# Patient Record
Sex: FEMALE | Race: WHITE | NOT HISPANIC OR LATINO | Employment: OTHER | URBAN - METROPOLITAN AREA
[De-identification: names, ages, dates, MRNs, and addresses within clinical notes are randomized per-mention and may not be internally consistent; named-entity substitution may affect disease eponyms.]

---

## 2023-02-06 ENCOUNTER — LAB REQUISITION (OUTPATIENT)
Dept: LAB | Facility: CLINIC | Age: 78
End: 2023-02-06
Payer: MEDICARE

## 2023-02-06 DIAGNOSIS — G62.9 POLYNEUROPATHY, UNSPECIFIED: ICD-10-CM

## 2023-02-06 DIAGNOSIS — E03.9 HYPOTHYROIDISM, UNSPECIFIED: ICD-10-CM

## 2023-02-06 LAB
ALBUMIN SERPL BCG-MCNC: 4.2 G/DL (ref 3.5–5.2)
ALP SERPL-CCNC: 87 U/L (ref 35–104)
ALT SERPL W P-5'-P-CCNC: 9 U/L (ref 10–35)
ANION GAP SERPL CALCULATED.3IONS-SCNC: 11 MMOL/L (ref 7–15)
AST SERPL W P-5'-P-CCNC: 15 U/L (ref 10–35)
BILIRUB SERPL-MCNC: 0.2 MG/DL
BUN SERPL-MCNC: 15 MG/DL (ref 8–23)
CALCIUM SERPL-MCNC: 9.8 MG/DL (ref 8.8–10.2)
CHLORIDE SERPL-SCNC: 102 MMOL/L (ref 98–107)
CREAT SERPL-MCNC: 0.91 MG/DL (ref 0.51–0.95)
DEPRECATED HCO3 PLAS-SCNC: 29 MMOL/L (ref 22–29)
GFR SERPL CREATININE-BSD FRML MDRD: 63 ML/MIN/1.73M2
GLUCOSE SERPL-MCNC: 88 MG/DL (ref 70–99)
POTASSIUM SERPL-SCNC: 3.9 MMOL/L (ref 3.4–5.3)
PROT SERPL-MCNC: 6.7 G/DL (ref 6.4–8.3)
SODIUM SERPL-SCNC: 142 MMOL/L (ref 136–145)
TSH SERPL DL<=0.005 MIU/L-ACNC: 0.19 UIU/ML (ref 0.3–4.2)

## 2023-02-06 PROCEDURE — 80053 COMPREHEN METABOLIC PANEL: CPT | Mod: ORL | Performed by: FAMILY MEDICINE

## 2023-02-06 PROCEDURE — 84443 ASSAY THYROID STIM HORMONE: CPT | Mod: ORL | Performed by: FAMILY MEDICINE

## 2023-10-24 ENCOUNTER — LAB REQUISITION (OUTPATIENT)
Dept: LAB | Facility: CLINIC | Age: 78
End: 2023-10-24
Payer: MEDICARE

## 2023-10-24 DIAGNOSIS — E03.9 HYPOTHYROIDISM, UNSPECIFIED: ICD-10-CM

## 2023-10-24 PROCEDURE — 84443 ASSAY THYROID STIM HORMONE: CPT | Mod: ORL | Performed by: FAMILY MEDICINE

## 2023-10-25 LAB — TSH SERPL DL<=0.005 MIU/L-ACNC: 0.4 UIU/ML (ref 0.3–4.2)

## 2024-01-15 ENCOUNTER — MEDICAL CORRESPONDENCE (OUTPATIENT)
Dept: HEALTH INFORMATION MANAGEMENT | Facility: CLINIC | Age: 79
End: 2024-01-15

## 2024-01-15 ENCOUNTER — LAB REQUISITION (OUTPATIENT)
Dept: LAB | Facility: CLINIC | Age: 79
End: 2024-01-15
Payer: MEDICARE

## 2024-01-15 ENCOUNTER — TRANSFERRED RECORDS (OUTPATIENT)
Dept: HEALTH INFORMATION MANAGEMENT | Facility: CLINIC | Age: 79
End: 2024-01-15

## 2024-01-15 DIAGNOSIS — Z01.818 ENCOUNTER FOR OTHER PREPROCEDURAL EXAMINATION: ICD-10-CM

## 2024-01-15 DIAGNOSIS — G62.9 POLYNEUROPATHY, UNSPECIFIED: ICD-10-CM

## 2024-01-15 LAB
ANION GAP SERPL CALCULATED.3IONS-SCNC: 11 MMOL/L (ref 7–15)
BUN SERPL-MCNC: 13.2 MG/DL (ref 8–23)
CALCIUM SERPL-MCNC: 9.7 MG/DL (ref 8.8–10.2)
CHLORIDE SERPL-SCNC: 102 MMOL/L (ref 98–107)
CREAT SERPL-MCNC: 0.82 MG/DL (ref 0.51–0.95)
DEPRECATED HCO3 PLAS-SCNC: 29 MMOL/L (ref 22–29)
EGFRCR SERPLBLD CKD-EPI 2021: 71 ML/MIN/1.73M2
GLUCOSE SERPL-MCNC: 68 MG/DL (ref 70–99)
POTASSIUM SERPL-SCNC: 3.5 MMOL/L (ref 3.4–5.3)
SODIUM SERPL-SCNC: 142 MMOL/L (ref 135–145)
VIT B12 SERPL-MCNC: 203 PG/ML (ref 232–1245)

## 2024-01-15 PROCEDURE — 80048 BASIC METABOLIC PNL TOTAL CA: CPT | Mod: ORL | Performed by: FAMILY MEDICINE

## 2024-01-15 PROCEDURE — 82607 VITAMIN B-12: CPT | Mod: ORL | Performed by: FAMILY MEDICINE

## 2024-01-31 ENCOUNTER — HOSPITAL ENCOUNTER (OUTPATIENT)
Dept: CARDIOLOGY | Facility: CLINIC | Age: 79
Discharge: HOME | End: 2024-01-31
Attending: FAMILY MEDICINE | Admitting: FAMILY MEDICINE
Payer: MEDICARE

## 2024-01-31 DIAGNOSIS — R01.1 HEART MURMUR: ICD-10-CM

## 2024-01-31 LAB — LVEF ECHO: NORMAL

## 2024-01-31 PROCEDURE — 93306 TTE W/DOPPLER COMPLETE: CPT

## 2024-01-31 PROCEDURE — 93306 TTE W/DOPPLER COMPLETE: CPT | Mod: 26 | Performed by: INTERNAL MEDICINE

## 2024-04-24 ENCOUNTER — LAB REQUISITION (OUTPATIENT)
Dept: LAB | Facility: CLINIC | Age: 79
End: 2024-04-24
Payer: MEDICARE

## 2024-04-24 DIAGNOSIS — Z13.220 ENCOUNTER FOR SCREENING FOR LIPOID DISORDERS: ICD-10-CM

## 2024-04-24 DIAGNOSIS — R30.0 DYSURIA: ICD-10-CM

## 2024-04-24 PROCEDURE — 80061 LIPID PANEL: CPT | Mod: ORL | Performed by: FAMILY MEDICINE

## 2024-04-24 PROCEDURE — 87086 URINE CULTURE/COLONY COUNT: CPT | Mod: ORL | Performed by: FAMILY MEDICINE

## 2024-04-25 LAB
CHOLEST SERPL-MCNC: 167 MG/DL
FASTING STATUS PATIENT QL REPORTED: NORMAL
HDLC SERPL-MCNC: 65 MG/DL
LDLC SERPL CALC-MCNC: 86 MG/DL
NONHDLC SERPL-MCNC: 102 MG/DL
TRIGL SERPL-MCNC: 80 MG/DL

## 2024-04-26 LAB — BACTERIA UR CULT: NORMAL

## 2024-05-15 ENCOUNTER — LAB REQUISITION (OUTPATIENT)
Dept: LAB | Facility: CLINIC | Age: 79
End: 2024-05-15
Payer: MEDICARE

## 2024-05-15 DIAGNOSIS — E53.8 DEFICIENCY OF OTHER SPECIFIED B GROUP VITAMINS: ICD-10-CM

## 2024-05-15 LAB — FOLATE SERPL-MCNC: 16.1 NG/ML (ref 4.6–34.8)

## 2024-05-15 PROCEDURE — 82746 ASSAY OF FOLIC ACID SERUM: CPT | Mod: ORL | Performed by: FAMILY MEDICINE

## 2024-05-15 PROCEDURE — 82607 VITAMIN B-12: CPT | Mod: ORL | Performed by: FAMILY MEDICINE

## 2024-05-16 LAB — VIT B12 SERPL-MCNC: 1754 PG/ML (ref 232–1245)

## 2025-01-07 ENCOUNTER — LAB REQUISITION (OUTPATIENT)
Dept: LAB | Facility: CLINIC | Age: 80
End: 2025-01-07
Payer: COMMERCIAL

## 2025-01-07 ENCOUNTER — TRANSFERRED RECORDS (OUTPATIENT)
Dept: HEALTH INFORMATION MANAGEMENT | Facility: CLINIC | Age: 80
End: 2025-01-07

## 2025-01-07 DIAGNOSIS — K29.70 GASTRITIS, UNSPECIFIED, WITHOUT BLEEDING: ICD-10-CM

## 2025-01-07 PROCEDURE — 87081 CULTURE SCREEN ONLY: CPT | Mod: ORL | Performed by: PHYSICIAN ASSISTANT

## 2025-01-08 LAB
ALBUMIN SERPL BCG-MCNC: 4.1 G/DL (ref 3.5–5.2)
ALP SERPL-CCNC: 66 U/L (ref 40–150)
ALT SERPL W P-5'-P-CCNC: 9 U/L (ref 0–50)
ANION GAP SERPL CALCULATED.3IONS-SCNC: 11 MMOL/L (ref 7–15)
AST SERPL W P-5'-P-CCNC: 18 U/L (ref 0–45)
BILIRUB SERPL-MCNC: 0.5 MG/DL
BUN SERPL-MCNC: 11.2 MG/DL (ref 8–23)
CALCIUM SERPL-MCNC: 9.7 MG/DL (ref 8.8–10.4)
CHLORIDE SERPL-SCNC: 101 MMOL/L (ref 98–107)
CREAT SERPL-MCNC: 0.83 MG/DL (ref 0.51–0.95)
EGFRCR SERPLBLD CKD-EPI 2021: 70 ML/MIN/1.73M2
GLUCOSE SERPL-MCNC: 85 MG/DL (ref 70–99)
HCO3 SERPL-SCNC: 27 MMOL/L (ref 22–29)
POTASSIUM SERPL-SCNC: 4 MMOL/L (ref 3.4–5.3)
PROT SERPL-MCNC: 7.1 G/DL (ref 6.4–8.3)
SODIUM SERPL-SCNC: 139 MMOL/L (ref 135–145)

## 2025-01-09 ENCOUNTER — LAB REQUISITION (OUTPATIENT)
Dept: LAB | Facility: CLINIC | Age: 80
End: 2025-01-09
Payer: COMMERCIAL

## 2025-01-09 LAB — BACTERIA SPEC CULT: NORMAL

## 2025-01-09 PROCEDURE — 87338 HPYLORI STOOL AG IA: CPT | Mod: ORL | Performed by: PHYSICIAN ASSISTANT

## 2025-01-13 LAB — H PYLORI AG STL QL IA: NEGATIVE

## 2025-01-14 ENCOUNTER — MEDICAL CORRESPONDENCE (OUTPATIENT)
Dept: HEALTH INFORMATION MANAGEMENT | Facility: CLINIC | Age: 80
End: 2025-01-14
Payer: COMMERCIAL

## 2025-01-15 ENCOUNTER — TRANSCRIBE ORDERS (OUTPATIENT)
Dept: OTHER | Age: 80
End: 2025-01-15

## 2025-01-15 DIAGNOSIS — G62.9 POLYNEUROPATHY: Primary | ICD-10-CM

## 2025-04-03 ENCOUNTER — LAB REQUISITION (OUTPATIENT)
Dept: LAB | Facility: CLINIC | Age: 80
End: 2025-04-03
Payer: COMMERCIAL

## 2025-04-03 DIAGNOSIS — E03.9 HYPOTHYROIDISM, UNSPECIFIED: ICD-10-CM

## 2025-04-03 PROCEDURE — 84443 ASSAY THYROID STIM HORMONE: CPT | Mod: ORL | Performed by: FAMILY MEDICINE

## 2025-04-03 PROCEDURE — 80053 COMPREHEN METABOLIC PANEL: CPT | Mod: ORL | Performed by: FAMILY MEDICINE

## 2025-04-03 PROCEDURE — 84439 ASSAY OF FREE THYROXINE: CPT | Mod: ORL | Performed by: FAMILY MEDICINE

## 2025-04-03 PROCEDURE — 80061 LIPID PANEL: CPT | Mod: ORL | Performed by: FAMILY MEDICINE

## 2025-04-04 LAB
ALBUMIN SERPL BCG-MCNC: 4.4 G/DL (ref 3.5–5.2)
ALP SERPL-CCNC: 77 U/L (ref 40–150)
ALT SERPL W P-5'-P-CCNC: 9 U/L (ref 0–50)
ANION GAP SERPL CALCULATED.3IONS-SCNC: 13 MMOL/L (ref 7–15)
AST SERPL W P-5'-P-CCNC: 20 U/L (ref 0–45)
BILIRUB SERPL-MCNC: 0.4 MG/DL
BUN SERPL-MCNC: 11.4 MG/DL (ref 8–23)
CALCIUM SERPL-MCNC: 9.8 MG/DL (ref 8.8–10.4)
CHLORIDE SERPL-SCNC: 97 MMOL/L (ref 98–107)
CHOLEST SERPL-MCNC: 174 MG/DL
CREAT SERPL-MCNC: 0.8 MG/DL (ref 0.51–0.95)
EGFRCR SERPLBLD CKD-EPI 2021: 73 ML/MIN/1.73M2
FASTING STATUS PATIENT QL REPORTED: ABNORMAL
FASTING STATUS PATIENT QL REPORTED: NORMAL
GLUCOSE SERPL-MCNC: 87 MG/DL (ref 70–99)
HCO3 SERPL-SCNC: 26 MMOL/L (ref 22–29)
HDLC SERPL-MCNC: 64 MG/DL
LDLC SERPL CALC-MCNC: 88 MG/DL
NONHDLC SERPL-MCNC: 110 MG/DL
POTASSIUM SERPL-SCNC: 3.9 MMOL/L (ref 3.4–5.3)
PROT SERPL-MCNC: 7.5 G/DL (ref 6.4–8.3)
SODIUM SERPL-SCNC: 136 MMOL/L (ref 135–145)
T4 FREE SERPL-MCNC: 1.78 NG/DL (ref 0.9–1.7)
TRIGL SERPL-MCNC: 108 MG/DL
TSH SERPL DL<=0.005 MIU/L-ACNC: 0.57 UIU/ML (ref 0.3–4.2)

## 2025-04-15 ENCOUNTER — LAB REQUISITION (OUTPATIENT)
Dept: LAB | Facility: CLINIC | Age: 80
End: 2025-04-15
Payer: COMMERCIAL

## 2025-04-15 DIAGNOSIS — I10 ESSENTIAL (PRIMARY) HYPERTENSION: ICD-10-CM

## 2025-04-16 LAB
ANION GAP SERPL CALCULATED.3IONS-SCNC: 15 MMOL/L (ref 7–15)
BUN SERPL-MCNC: 12.3 MG/DL (ref 8–23)
CALCIUM SERPL-MCNC: 10.2 MG/DL (ref 8.8–10.4)
CHLORIDE SERPL-SCNC: 94 MMOL/L (ref 98–107)
CREAT SERPL-MCNC: 0.84 MG/DL (ref 0.51–0.95)
EGFRCR SERPLBLD CKD-EPI 2021: 69 ML/MIN/1.73M2
GLUCOSE SERPL-MCNC: 86 MG/DL (ref 70–99)
HCO3 SERPL-SCNC: 25 MMOL/L (ref 22–29)
POTASSIUM SERPL-SCNC: 3.9 MMOL/L (ref 3.4–5.3)
SODIUM SERPL-SCNC: 134 MMOL/L (ref 135–145)

## 2025-05-20 PROBLEM — G62.9 PERIPHERAL NEUROPATHY: Status: ACTIVE | Noted: 2022-03-03

## 2025-05-20 PROBLEM — E03.9 HYPOTHYROIDISM: Status: ACTIVE | Noted: 2022-03-03

## 2025-05-20 PROBLEM — M81.0 OSTEOPOROSIS: Status: ACTIVE | Noted: 2022-03-03

## 2025-05-20 PROBLEM — G89.29 CHRONIC NONINTRACTABLE HEADACHE: Status: ACTIVE | Noted: 2022-03-03

## 2025-05-20 PROBLEM — K58.9 IRRITABLE BOWEL SYNDROME: Status: ACTIVE | Noted: 2022-03-03

## 2025-05-20 PROBLEM — R51.9 CHRONIC NONINTRACTABLE HEADACHE: Status: ACTIVE | Noted: 2022-03-03

## 2025-05-20 PROBLEM — K21.9 GASTROESOPHAGEAL REFLUX DISEASE: Status: ACTIVE | Noted: 2022-03-03

## 2025-05-20 NOTE — PROGRESS NOTES
"NEUROLOGY CONSULTATION NOTE       [facility] NEUROLOGY [place]  [address] [place] [address]  Tel: [phone]  Fax: [phone]  [email or web address]     Lennie De La O,  1945, MRN [id number]  PCP: Colton Blackmon  Date: 2025     ASSESSMENT & PLAN     Visit Diagnosis  Motor polyneuropathy     Motor polyneuropathy  80-year-old female with history of B12 deficiency, Sjogren's syndrome, osteoporosis, GERD who was diagnosed with peripheral neuropathy at [facility] in the past who presents to our clinic to establish care.  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  Workup included basic labs and Charcot-Raven-Tooth evaluation.  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  Repeat EMG of bilateral lower extremities  2.  Lab work to include angiotensin-converting enzyme, antinuclear antibody, GM1 antibody, Lyme titer, MAG and SG PG antibody, MMA, paraneoplastic panel, SPEP, immunofixation, SSA/SSB antibody, B1, B6, B12, vitamin E and zinc  3.  Continue current dose of gabapentin 300 mg  4.  Follow-up the day she is scheduled for EMG    Thank you again for this referral, please feel free to contact me if you have any questions.    Angel Luo MD  [facility]     REASON FOR CONSULTATION Numbness        HISTORY OF PRESENT ILLNESS     We have been requested by Dr. Blackmon to evaluate Lennie De La O who is a 80 year old  female for paresthesias    Patient is a 80-year-old female with history of B12 deficiency, Sjogren syndrome, osteoporosis, GERD who was diagnosed with peripheral neuropathy at [facility] who presents to the clinic to establish care.  She started having these symptoms almost 12 years ago.  She developed numbness tingling and also noticed some weakness in her legs.  She started having some syncopal episodes that occurred while she was seated without any aura.  She had a EEG that " "was normal and also MRI that did not show any abnormality.  EMG in the past suggested length-dependent motor greater than sensory neuropathy.  Her neuropathy was labeled as \"possible old polio versus old GBS).  Prior to that she was diagnosed with Sjogren syndrome with no progression of dry eyes or dry mouth.  She had some basic lab work including CMT that was normal.  She has noticed steady decline in her ability to walk and has increased balance issues and uses a cane.     PROBLEM LIST   Patient Active Problem List   Diagnosis    Chronic nonintractable headache    Gastroesophageal reflux disease    Hypothyroidism    Irritable bowel syndrome    Osteoporosis    Peripheral neuropathy         PAST MEDICAL & SURGICAL HISTORY     Past Medical History:   Patient  has no past medical history on file.    Surgical History:  She  has no past surgical history on file.     SOCIAL HISTORY     Reviewed, and she  reports that she has never smoked. She has never used smokeless tobacco.     FAMILY HISTORY     Reviewed, and family history is not on file.     ALLERGIES     Allergies   Allergen Reactions    Alendronate Other (See Comments)    Lidocaine Other (See Comments)     \"sunburnt\"    Sunburn type of reaction    \"sunburnt\"      Sunburn type of reaction    Nitrofuran Derivatives Other (See Comments)    Levofloxacin Other (See Comments), Rash and Unknown     Unknown reaction    Patient states that she is not supposed to take because of neuropathy    Unknown reaction      Patient states that she is not supposed to take because of neuropathy    Penicillins Unknown and Rash    Prednisone Unknown, Other (See Comments) and Rash     \"sunburnt\"     Sunburn type of reaction    \"sunburnt\"       Sunburn type of reaction    Sulfa Antibiotics Rash         REVIEW OF SYSTEMS     A 12 point review of system was performed and was negative except as outlined in the history of present illness.     HOME MEDICATIONS     Current Outpatient Rx "   Medication Sig Dispense Refill    acetaminophen (TYLENOL) 500 MG tablet Take 500-1,000 mg by mouth every 6 hours as needed for mild pain.      alendronate (FOSAMAX) 70 MG tablet       cyanocobalamin (VITAMIN B-12) 1000 MCG sublingual tablet 1 TABLET UNDER THE TONGUE AND ALLOW TO DISSOLVE SUBLINGUAL ONCE A DAY      famotidine (PEPCID) 20 MG tablet take 1 tablet by mouth every day at bedtime as needed for 30 days      gabapentin (NEURONTIN) 300 MG capsule Take 300 mg by mouth.      levothyroxine (SYNTHROID/LEVOTHROID) 75 MCG tablet TAKE 1 TABLET BY MOUTH EVERY DAY IN THE MORNING ON EMPTY STOMACH FOR 90 DAYS           PHYSICAL EXAM     Vital signs  /81 (BP Location: Right arm, Patient Position: Sitting)   Pulse 68     Weight:   0 lbs 0 oz    Thin elderly female who is alert and oriented vital signs are reviewed and documented electronic medical record.  Neck supple.  Neurologically speech was normal.  Cranial nerves II through XII are intact except she is hard of hearing.  Motor strength 5 -/5.  Reflexes absent.  Sensation decreased to pinprick vibratory sensation below knees.  She has a wide-based gait with excessive sway on Romberg testing she walks with a cane     PERTINENT DIAGNOSTIC STUDIES     Following studies were reviewed:     CT BRAIN 3/27/2025  1.  No CT evidence for acute intracranial process.   2.  Brain atrophy and presumed chronic microvascular ischemic changes as above.         CAROTID ULTRASOUND 2/23/2020  1. Right: No internal carotid artery stenosis.     2. Left: No internal carotid artery stenosis.        PERTINENT LABS  Following labs were reviewed:  Lab Requisition on 04/15/2025   Component Date Value Ref Range Status    Sodium 04/15/2025 134 (L)  135 - 145 mmol/L Final    Potassium 04/15/2025 3.9  3.4 - 5.3 mmol/L Final    Chloride 04/15/2025 94 (L)  98 - 107 mmol/L Final    Carbon Dioxide (CO2) 04/15/2025 25  22 - 29 mmol/L Final    Anion Gap 04/15/2025 15  7 - 15 mmol/L Final    Urea  Nitrogen 04/15/2025 12.3  8.0 - 23.0 mg/dL Final    Creatinine 04/15/2025 0.84  0.51 - 0.95 mg/dL Final    GFR Estimate 04/15/2025 69  >60 mL/min/1.73m2 Final    Calcium 04/15/2025 10.2  8.8 - 10.4 mg/dL Final    Glucose 04/15/2025 86  70 - 99 mg/dL Final   Lab Requisition on 04/03/2025   Component Date Value Ref Range Status    TSH 04/03/2025 0.57  0.30 - 4.20 uIU/mL Final    Sodium 04/03/2025 136  135 - 145 mmol/L Final    Potassium 04/03/2025 3.9  3.4 - 5.3 mmol/L Final    Carbon Dioxide (CO2) 04/03/2025 26  22 - 29 mmol/L Final    Anion Gap 04/03/2025 13  7 - 15 mmol/L Final    Urea Nitrogen 04/03/2025 11.4  8.0 - 23.0 mg/dL Final    Creatinine 04/03/2025 0.80  0.51 - 0.95 mg/dL Final    GFR Estimate 04/03/2025 73  >60 mL/min/1.73m2 Final    Calcium 04/03/2025 9.8  8.8 - 10.4 mg/dL Final    Chloride 04/03/2025 97 (L)  98 - 107 mmol/L Final    Glucose 04/03/2025 87  70 - 99 mg/dL Final    Alkaline Phosphatase 04/03/2025 77  40 - 150 U/L Final    AST 04/03/2025 20  0 - 45 U/L Final    ALT 04/03/2025 9  0 - 50 U/L Final    Protein Total 04/03/2025 7.5  6.4 - 8.3 g/dL Final    Albumin 04/03/2025 4.4  3.5 - 5.2 g/dL Final    Bilirubin Total 04/03/2025 0.4  <=1.2 mg/dL Final    Patient Fasting > 8hrs? 04/03/2025 Unknown   Final    Cholesterol 04/03/2025 174  <200 mg/dL Final    Triglycerides 04/03/2025 108  <150 mg/dL Final    Direct Measure HDL 04/03/2025 64  >=50 mg/dL Final    LDL Cholesterol Calculated 04/03/2025 88  <100 mg/dL Final    Non HDL Cholesterol 04/03/2025 110  <130 mg/dL Final    Patient Fasting > 8hrs? 04/03/2025 Unknown   Final    Free T4 04/03/2025 1.78 (H)  0.90 - 1.70 ng/dL Final        Total time spent for face to face visit, reviewing labs/imaging studies, counseling and coordination of care was: 1 Hour spent on the date of the encounter doing chart review, review of outside records, review of test results, interpretation of tests, patient visit, and documentation     The longitudinal plan of  care for the diagnosis(es)/condition(s) as documented were addressed during this visit. Due to the added complexity in care, I will continue to support Lennie in the subsequent management and with ongoing continuity of care.    This note was dictated using voice recognition software.  Any grammatical or context distortions are unintentional and inherent to the software.    No orders of the defined types were placed in this encounter.     New Prescriptions    No medications on file      Modified Medications    No medications on file

## 2025-05-22 ENCOUNTER — OFFICE VISIT (OUTPATIENT)
Dept: NEUROLOGY | Facility: CLINIC | Age: 80
End: 2025-05-22
Attending: FAMILY MEDICINE
Payer: COMMERCIAL

## 2025-05-22 VITALS — HEART RATE: 68 BPM | SYSTOLIC BLOOD PRESSURE: 119 MMHG | DIASTOLIC BLOOD PRESSURE: 81 MMHG

## 2025-05-22 DIAGNOSIS — G62.89 PERIPHERAL MOTOR NEUROPATHY: Primary | ICD-10-CM

## 2025-05-22 DIAGNOSIS — R63.30 FEEDING DIFFICULTIES, UNSPECIFIED: ICD-10-CM

## 2025-05-22 PROBLEM — M35.00 SJOGREN'S SYNDROME: Status: ACTIVE | Noted: 2025-05-22

## 2025-05-22 PROBLEM — Z83.49 FAMILY HISTORY OF B12 DEFICIENCY: Status: RESOLVED | Noted: 2025-05-22 | Resolved: 2025-05-22

## 2025-05-22 PROBLEM — Z83.49 FAMILY HISTORY OF B12 DEFICIENCY: Status: ACTIVE | Noted: 2025-05-22

## 2025-05-22 RX ORDER — LEVOTHYROXINE SODIUM 75 UG/1
TABLET ORAL
COMMUNITY

## 2025-05-22 RX ORDER — ACETAMINOPHEN 500 MG
500-1000 TABLET ORAL EVERY 6 HOURS PRN
COMMUNITY

## 2025-05-22 RX ORDER — ALENDRONATE SODIUM 70 MG/1
TABLET ORAL
COMMUNITY
Start: 2024-10-26

## 2025-05-22 RX ORDER — FAMOTIDINE 20 MG/1
TABLET, FILM COATED ORAL
COMMUNITY
Start: 2025-02-06

## 2025-05-22 RX ORDER — GABAPENTIN 300 MG/1
300 CAPSULE ORAL
COMMUNITY

## 2025-05-22 NOTE — LETTER
"2025      Lennie De La O  [address]  [facility] [address]      Dear Colleague,    Thank you for referring your patient, Lennie De La O, to the [facility] NEUROLOGY CLINIC [place]. Please see a copy of my visit note below.    NEUROLOGY CONSULTATION NOTE       [facility] NEUROLOGY [place]  [address] [place] [address]  Tel: [phone]  Fax: [phone]  [email or web address]     Lennie De La O,  1945, MRN [id number]  PCP: Colton Blackmon  Date: 2025     ASSESSMENT & PLAN     Visit Diagnosis  Motor polyneuropathy     Motor polyneuropathy  80-year-old female with history of B12 deficiency, Sjogren's syndrome, osteoporosis, GERD who was diagnosed with peripheral neuropathy at [facility] in the past who presents to our clinic to establish care.  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  Workup included basic labs and Charcot-Raven-Tooth evaluation.  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  Repeat EMG of bilateral lower extremities  2.  Lab work to include angiotensin-converting enzyme, antinuclear antibody, GM1 antibody, Lyme titer, MAG and SG PG antibody, MMA, paraneoplastic panel, SPEP, immunofixation, SSA/SSB antibody, B1, B6, B12, vitamin E and zinc  3.  Continue current dose of gabapentin 300 mg  4.  Follow-up the day she is scheduled for EMG    Thank you again for this referral, please feel free to contact me if you have any questions.    Angel Luo MD  [facility] NEUROLOGY, [place]     REASON FOR CONSULTATION Numbness        HISTORY OF PRESENT ILLNESS     We have been requested by Dr. Blackmon to evaluate Lennie De La O who is a 80 year old  female for paresthesias    Patient is a 80-year-old female with history of B12 deficiency, Sjogren syndrome, osteoporosis, GERD who was diagnosed with peripheral neuropathy at [facility] who presents to the clinic to establish " "care.  She started having these symptoms almost 12 years ago.  She developed numbness tingling and also noticed some weakness in her legs.  She started having some syncopal episodes that occurred while she was seated without any aura.  She had a EEG that was normal and also MRI that did not show any abnormality.  EMG in the past suggested length-dependent motor greater than sensory neuropathy.  Her neuropathy was labeled as \"possible old polio versus old GBS).  Prior to that she was diagnosed with Sjogren syndrome with no progression of dry eyes or dry mouth.  She had some basic lab work including CMT that was normal.  She has noticed steady decline in her ability to walk and has increased balance issues and uses a cane.     PROBLEM LIST   Patient Active Problem List   Diagnosis     Chronic nonintractable headache     Gastroesophageal reflux disease     Hypothyroidism     Irritable bowel syndrome     Osteoporosis     Peripheral neuropathy         PAST MEDICAL & SURGICAL HISTORY     Past Medical History:   Patient  has no past medical history on file.    Surgical History:  She  has no past surgical history on file.     SOCIAL HISTORY     Reviewed, and she  reports that she has never smoked. She has never used smokeless tobacco.     FAMILY HISTORY     Reviewed, and family history is not on file.     ALLERGIES     Allergies   Allergen Reactions     Alendronate Other (See Comments)     Lidocaine Other (See Comments)     \"sunburnt\"    Sunburn type of reaction    \"sunburnt\"      Sunburn type of reaction     Nitrofuran Derivatives Other (See Comments)     Levofloxacin Other (See Comments), Rash and Unknown     Unknown reaction    Patient states that she is not supposed to take because of neuropathy    Unknown reaction      Patient states that she is not supposed to take because of neuropathy     Penicillins Unknown and Rash     Prednisone Unknown, Other (See Comments) and Rash     \"sunburnt\"     Sunburn type of " "reaction    \"sunburnt\"       Sunburn type of reaction     Sulfa Antibiotics Rash         REVIEW OF SYSTEMS     A 12 point review of system was performed and was negative except as outlined in the history of present illness.     HOME MEDICATIONS     Current Outpatient Rx   Medication Sig Dispense Refill     acetaminophen (TYLENOL) 500 MG tablet Take 500-1,000 mg by mouth every 6 hours as needed for mild pain.       alendronate (FOSAMAX) 70 MG tablet        cyanocobalamin (VITAMIN B-12) 1000 MCG sublingual tablet 1 TABLET UNDER THE TONGUE AND ALLOW TO DISSOLVE SUBLINGUAL ONCE A DAY       famotidine (PEPCID) 20 MG tablet take 1 tablet by mouth every day at bedtime as needed for 30 days       gabapentin (NEURONTIN) 300 MG capsule Take 300 mg by mouth.       levothyroxine (SYNTHROID/LEVOTHROID) 75 MCG tablet TAKE 1 TABLET BY MOUTH EVERY DAY IN THE MORNING ON EMPTY STOMACH FOR 90 DAYS           PHYSICAL EXAM     Vital signs  /81 (BP Location: Right arm, Patient Position: Sitting)   Pulse 68     Weight:   0 lbs 0 oz    Thin elderly female who is alert and oriented vital signs are reviewed and documented electronic medical record.  Neck supple.  Neurologically speech was normal.  Cranial nerves II through XII are intact except she is hard of hearing.  Motor strength 5 -/5.  Reflexes absent.  Sensation decreased to pinprick vibratory sensation below knees.  She has a wide-based gait with excessive sway on Romberg testing she walks with a cane     PERTINENT DIAGNOSTIC STUDIES     Following studies were reviewed:     CT BRAIN 3/27/2025  1.  No CT evidence for acute intracranial process.   2.  Brain atrophy and presumed chronic microvascular ischemic changes as above.         CAROTID ULTRASOUND 2/23/2020  1. Right: No internal carotid artery stenosis.     2. Left: No internal carotid artery stenosis.        PERTINENT LABS  Following labs were reviewed:  Lab Requisition on 04/15/2025   Component Date Value Ref Range " Status     Sodium 04/15/2025 134 (L)  135 - 145 mmol/L Final     Potassium 04/15/2025 3.9  3.4 - 5.3 mmol/L Final     Chloride 04/15/2025 94 (L)  98 - 107 mmol/L Final     Carbon Dioxide (CO2) 04/15/2025 25  22 - 29 mmol/L Final     Anion Gap 04/15/2025 15  7 - 15 mmol/L Final     Urea Nitrogen 04/15/2025 12.3  8.0 - 23.0 mg/dL Final     Creatinine 04/15/2025 0.84  0.51 - 0.95 mg/dL Final     GFR Estimate 04/15/2025 69  >60 mL/min/1.73m2 Final     Calcium 04/15/2025 10.2  8.8 - 10.4 mg/dL Final     Glucose 04/15/2025 86  70 - 99 mg/dL Final   Lab Requisition on 04/03/2025   Component Date Value Ref Range Status     TSH 04/03/2025 0.57  0.30 - 4.20 uIU/mL Final     Sodium 04/03/2025 136  135 - 145 mmol/L Final     Potassium 04/03/2025 3.9  3.4 - 5.3 mmol/L Final     Carbon Dioxide (CO2) 04/03/2025 26  22 - 29 mmol/L Final     Anion Gap 04/03/2025 13  7 - 15 mmol/L Final     Urea Nitrogen 04/03/2025 11.4  8.0 - 23.0 mg/dL Final     Creatinine 04/03/2025 0.80  0.51 - 0.95 mg/dL Final     GFR Estimate 04/03/2025 73  >60 mL/min/1.73m2 Final     Calcium 04/03/2025 9.8  8.8 - 10.4 mg/dL Final     Chloride 04/03/2025 97 (L)  98 - 107 mmol/L Final     Glucose 04/03/2025 87  70 - 99 mg/dL Final     Alkaline Phosphatase 04/03/2025 77  40 - 150 U/L Final     AST 04/03/2025 20  0 - 45 U/L Final     ALT 04/03/2025 9  0 - 50 U/L Final     Protein Total 04/03/2025 7.5  6.4 - 8.3 g/dL Final     Albumin 04/03/2025 4.4  3.5 - 5.2 g/dL Final     Bilirubin Total 04/03/2025 0.4  <=1.2 mg/dL Final     Patient Fasting > 8hrs? 04/03/2025 Unknown   Final     Cholesterol 04/03/2025 174  <200 mg/dL Final     Triglycerides 04/03/2025 108  <150 mg/dL Final     Direct Measure HDL 04/03/2025 64  >=50 mg/dL Final     LDL Cholesterol Calculated 04/03/2025 88  <100 mg/dL Final     Non HDL Cholesterol 04/03/2025 110  <130 mg/dL Final     Patient Fasting > 8hrs? 04/03/2025 Unknown   Final     Free T4 04/03/2025 1.78 (H)  0.90 - 1.70 ng/dL Final         Total time spent for face to face visit, reviewing labs/imaging studies, counseling and coordination of care was: 1 Hour spent on the date of the encounter doing chart review, review of outside records, review of test results, interpretation of tests, patient visit, and documentation     The longitudinal plan of care for the diagnosis(es)/condition(s) as documented were addressed during this visit. Due to the added complexity in care, I will continue to support Lennie in the subsequent management and with ongoing continuity of care.    This note was dictated using voice recognition software.  Any grammatical or context distortions are unintentional and inherent to the software.    No orders of the defined types were placed in this encounter.     New Prescriptions    No medications on file      Modified Medications    No medications on file              Again, thank you for allowing me to participate in the care of your patient.        Sincerely,        Angel Luo MD    Electronically signed

## 2025-05-22 NOTE — NURSING NOTE
Chief Complaint   Patient presents with    Numbness     Numbness and swelling on legs, sleep with leg on a pillow and puts them up on a recliner. Body feels shuts down by 4pm.     Kelli Maddox CMA on 5/22/2025 at 9:37 AM

## 2025-05-23 ENCOUNTER — TELEPHONE (OUTPATIENT)
Dept: NEUROLOGY | Facility: CLINIC | Age: 80
End: 2025-05-23
Payer: COMMERCIAL

## 2025-05-23 NOTE — TELEPHONE ENCOUNTER
RN returned call to Lennie. Patient states she was prescribed Lyrica by her new PCP to replace her gabapentin (600 mg AM, 300 mg PM, 900 mg bedtime). Considering she has been on gabapentin for many years, she would like Dr. Luo's input on the switch to Lyrica. Patient insisted on getting Dr. Luo's input prior to beginning her taper off of the gabapentin.     Sobeida ROE RN, BSN  [facility] Neurology

## 2025-05-23 NOTE — TELEPHONE ENCOUNTER
[facility] Call Center    Phone Message    May a detailed message be left on voicemail: yes     Reason for Call: Other: Pt would like a call back back to discuss is gabapentin (NEURONTIN) 300 MG capsule    Please contact pt at [phone]    Action Taken: Message routed to:  Other: [facility] Neurology    Travel Screening: Not Applicable     Date of Service:

## 2025-05-23 NOTE — TELEPHONE ENCOUNTER
I did not get the impression that her peripheral neuropathy symptoms were flaring up and if she has been on gabapentin for years I do not see any reason to switch her from gabapentin to Lyrica

## 2025-05-28 NOTE — TELEPHONE ENCOUNTER
RN returned call to [facility] and relayed provider message below. Patient has no further questions at this time.     Sobeida ROE RN, BSN  [facility] Neurology

## 2025-06-26 ENCOUNTER — LAB (OUTPATIENT)
Dept: LAB | Facility: CLINIC | Age: 80
End: 2025-06-26
Payer: COMMERCIAL

## 2025-06-26 DIAGNOSIS — R63.30 FEEDING DIFFICULTIES, UNSPECIFIED: ICD-10-CM

## 2025-06-26 DIAGNOSIS — G62.89 PERIPHERAL MOTOR NEUROPATHY: ICD-10-CM

## 2025-06-28 LAB
ACE SERPL-CCNC: 59 U/L
ENA SS-A AB SER IA-ACNC: 30 U/ML
ENA SS-A AB SER IA-ACNC: POSITIVE
ENA SS-B IGG SER IA-ACNC: <0.6 U/ML
ENA SS-B IGG SER IA-ACNC: NEGATIVE
MAG IGM SER IA-ACNC: <1000 TU
SGPG IGM SER-ACNC: 0.08 IV

## 2025-06-29 LAB
A-TOCOPHEROL VIT E SERPL-MCNC: 7.6 MG/L
BETA+GAMMA TOCOPHEROL SERPL-MCNC: 1.2 MG/L
VIT B1 PYROPHOSHATE BLD-SCNC: 159 NMOL/L

## 2025-06-30 ENCOUNTER — DOCUMENTATION ONLY (OUTPATIENT)
Dept: NEUROLOGY | Facility: CLINIC | Age: 80
End: 2025-06-30
Payer: COMMERCIAL

## 2025-06-30 DIAGNOSIS — G62.89 PERIPHERAL MOTOR NEUROPATHY: Primary | ICD-10-CM

## 2025-06-30 NOTE — PROGRESS NOTES
Good Afternoon,    I wanted to reach out and let you know that the Vit B6 that was drawn on this pt was processed incorrectly, if you still would like this test to be completed the pt will have to be redrawn.  I apologize for any inconvenience.    Thank you,    Marie GIBSON, CMA

## 2025-07-01 LAB
GM1 GANGL IGG SER IA-ACNC: 5 IV
GM1 GANGL IGM SER IA-ACNC: 4 IV

## 2025-07-02 LAB — METHYLMALONATE SERPL-SCNC: 0.22 UMOL/L (ref 0–0.4)

## 2025-07-09 ENCOUNTER — LAB (OUTPATIENT)
Dept: LAB | Facility: CLINIC | Age: 80
End: 2025-07-09
Payer: COMMERCIAL

## 2025-07-09 DIAGNOSIS — G62.89 PERIPHERAL MOTOR NEUROPATHY: ICD-10-CM

## 2025-07-09 PROCEDURE — 99000 SPECIMEN HANDLING OFFICE-LAB: CPT

## 2025-07-09 PROCEDURE — 84207 ASSAY OF VITAMIN B-6: CPT | Mod: 90

## 2025-07-09 PROCEDURE — 36415 COLL VENOUS BLD VENIPUNCTURE: CPT

## 2025-07-13 ENCOUNTER — RESULTS FOLLOW-UP (OUTPATIENT)
Dept: NEUROLOGY | Facility: CLINIC | Age: 80
End: 2025-07-13
Payer: COMMERCIAL

## 2025-07-13 LAB — PYRIDOXAL PHOS SERPL-SCNC: 28.6 NMOL/L

## 2025-07-22 ENCOUNTER — LAB REQUISITION (OUTPATIENT)
Dept: LAB | Facility: CLINIC | Age: 80
End: 2025-07-22
Payer: COMMERCIAL

## 2025-07-22 DIAGNOSIS — R35.0 FREQUENCY OF MICTURITION: ICD-10-CM

## 2025-07-22 DIAGNOSIS — E03.9 HYPOTHYROIDISM, UNSPECIFIED: ICD-10-CM

## 2025-07-23 LAB
T4 FREE SERPL-MCNC: 1.26 NG/DL (ref 0.9–1.7)
TSH SERPL DL<=0.005 MIU/L-ACNC: 3.13 UIU/ML (ref 0.3–4.2)

## 2025-07-24 LAB — BACTERIA UR CULT: NORMAL

## 2025-08-21 ENCOUNTER — OFFICE VISIT (OUTPATIENT)
Dept: NEUROLOGY | Facility: CLINIC | Age: 80
End: 2025-08-21
Attending: PSYCHIATRY & NEUROLOGY
Payer: COMMERCIAL

## 2025-08-21 VITALS — SYSTOLIC BLOOD PRESSURE: 142 MMHG | DIASTOLIC BLOOD PRESSURE: 86 MMHG

## 2025-08-21 DIAGNOSIS — G60.8 POLYNEUROPATHY, PERIPHERAL SENSORIMOTOR AXONAL: Primary | ICD-10-CM

## 2025-08-21 DIAGNOSIS — G60.8 POLYNEUROPATHY, PERIPHERAL SENSORIMOTOR AXONAL: ICD-10-CM

## 2025-08-21 RX ORDER — PREGABALIN 50 MG/1
50 CAPSULE ORAL 3 TIMES DAILY
COMMUNITY
Start: 2025-04-23